# Patient Record
Sex: MALE | Race: OTHER | HISPANIC OR LATINO | URBAN - METROPOLITAN AREA
[De-identification: names, ages, dates, MRNs, and addresses within clinical notes are randomized per-mention and may not be internally consistent; named-entity substitution may affect disease eponyms.]

---

## 2019-12-04 ENCOUNTER — EMERGENCY (EMERGENCY)
Facility: HOSPITAL | Age: 29
LOS: 1 days | Discharge: HOME | End: 2019-12-04
Admitting: EMERGENCY MEDICINE
Payer: SELF-PAY

## 2019-12-04 VITALS
OXYGEN SATURATION: 99 % | RESPIRATION RATE: 18 BRPM | HEART RATE: 90 BPM | DIASTOLIC BLOOD PRESSURE: 95 MMHG | TEMPERATURE: 98 F | SYSTOLIC BLOOD PRESSURE: 142 MMHG

## 2019-12-04 PROCEDURE — 99283 EMERGENCY DEPT VISIT LOW MDM: CPT

## 2019-12-04 PROCEDURE — 99053 MED SERV 10PM-8AM 24 HR FAC: CPT

## 2019-12-04 RX ORDER — NEOMYCIN/POLYMYXIN B/HYDROCORT
4 SUSPENSION, DROPS(FINAL DOSAGE FORM)(ML) OTIC (EAR) ONCE
Refills: 0 | Status: COMPLETED | OUTPATIENT
Start: 2019-12-04 | End: 2019-12-04

## 2019-12-04 RX ORDER — NEOMYCIN/POLYMYXIN B/HYDROCORT
4 SUSPENSION, DROPS(FINAL DOSAGE FORM)(ML) OTIC (EAR)
Qty: 1 | Refills: 0
Start: 2019-12-04 | End: 2019-12-10

## 2019-12-04 RX ORDER — IBUPROFEN 200 MG
600 TABLET ORAL ONCE
Refills: 0 | Status: COMPLETED | OUTPATIENT
Start: 2019-12-04 | End: 2019-12-04

## 2019-12-04 RX ADMIN — Medication 600 MILLIGRAM(S): at 06:35

## 2019-12-04 RX ADMIN — Medication 4 DROP(S): at 06:36

## 2019-12-04 NOTE — ED PROVIDER NOTE - PATIENT PORTAL LINK FT
You can access the FollowMyHealth Patient Portal offered by [facility] by registering at the following website: [email or web address]. By joining [employer]’s FollowMyHealth portal, you will also be able to view your health information using other applications (apps) compatible with our system.

## 2019-12-04 NOTE — ED PROVIDER NOTE - CLINICAL SUMMARY MEDICAL DECISION MAKING FREE TEXT BOX
Left ear pain and hearing loss: clinical exam consistent with acute otitis externa.  No evidence of mastoiditis.  Will tx with otic drops.  Follow up with PCP/ENT.

## 2019-12-04 NOTE — ED PROVIDER NOTE - PHYSICAL EXAMINATION
CONSTITUTIONAL: WD,WN. NAD.    SKIN: Normal color and turgor. No rash.    HEAD: NC/AT.  EYES: Conjunctiva clear. EOMI. PERRL.    ENT: Airway patent, OP without erythema, tonsillar swelling or exudate; uvula midline without swelling. Nasal mucosa clear, no rhinorrhea.  Right external ear, EAC, and TM normal.  Left ear: + pain with manipulation of tragus and pinna.  EAC moderately swollen with trace debris, not completely closed.  Unable to visualize TM.  No mastoid tenderness or swelling, no proptosis of ear.   RESPIRATORY:  Breathing non-labored. No retractions or accessory muscle use.  Lungs CTA bilat.  CARDIOVASCULAR:  RRR, S1S2. No M/R/G.      GI:  Abdomen soft, nontender.    MSK: Neck supple with painless ROM.  No lower extremity edema or calf tenderness.  No joint swelling or ROM limitation.  NEURO: Alert and oriented; CN II-XII grossly intact. Speech clear. 5/5 strength in all extremities.  Normal balance and gait.

## 2019-12-04 NOTE — ED PROVIDER NOTE - OBJECTIVE STATEMENT
30 yo m without significant PMHx c/o left ear pain.    Duration apx 2 days; began during flight home from [facility].  Associated decreased ability to hear; no dizziness.  No URI symptoms or sore throat.  No recent swimming/scuba, or significant water exposure, but admits to using Q-tips after showering.  No recollection of trauma to ear.  Took ibuprofen 600 mg yesterday afternoon with good pain relief. Also using OTC homeopathic ear pain drops.      PMHx none   PShx none  Meds ibuprofen PRN  NKA

## 2019-12-04 NOTE — ED PROVIDER NOTE - NSFOLLOWUPINSTRUCTIONS_ED_ALL_ED_FT
Use the prescribed ear drops (4 drops in the left ear canal, 4 times daily) for 1 week.   Follow up with your  Primary Care Provider or ENT within 1 week.   Return to the Emergency Department if you have any new or worsening symptoms, or if you have any concerns.  ___________________________________________________________    OTITIS EXTERNA - AfterCare(R) Instructions(ER/ED)     Otitis Externa    WHAT YOU NEED TO KNOW:    Otitis externa, or swimmer's ear, is an infection in the outer ear canal. This canal goes from the outside of the ear to the eardrum. Ear Anatomy         DISCHARGE INSTRUCTIONS:    Return to the emergency department if:     You have severe ear pain.      You are suddenly unable to hear at all.      You have new swelling in your face, behind your ears, or in your neck.      You suddenly cannot move part of your face.      Your face suddenly feels numb.    Contact your healthcare provider if:     You have a fever.       Your signs and symptoms do not get better after 2 days of treatment.       Your signs and symptoms go away for a time, but then come back.       You have questions or concerns about your condition or care.     Medicines:     NSAIDs, such as ibuprofen, help decrease swelling, pain, and fever. This medicine is available with or without a doctor's order. NSAIDs can cause stomach bleeding or kidney problems in certain people. If you take blood thinner medicine, always ask if NSAIDs are safe for you. Always read the medicine label and follow directions. Do not give these medicines to children under 6 months of age without direction from your child's healthcare provider.      Acetaminophen decreases pain and fever. It is available without a doctor's order. Ask how much to take and how often to take it. Follow directions. Acetaminophen can cause liver damage if not taken correctly.      Ear drops that contain an antibiotic may be given. The antibiotic helps treat a bacterial infection. You may also be given steroid medicine. The steroid helps decrease redness, swelling, and pain.       Take your medicine as directed. Contact your healthcare provider if you think your medicine is not helping or if you have side effects. Tell him or her if you are allergic to any medicine. Keep a list of the medicines, vitamins, and herbs you take. Include the amounts, and when and why you take them. Bring the list or the pill bottles to follow-up visits. Carry your medicine list with you in case of an emergency.    Follow up with your healthcare provider as directed: Write down your questions so you remember to ask them during your visits.    How to use eardrops:     Lie down on your side with your infected ear facing up.      Carefully drip the correct number of eardrops into your ear. Have another person help you if possible.      Gently move the outside part of your ear back and forth to help the medicine reach your ear canal.       Stay lying down in the same position (with your ear facing up) for 3 to 5 minutes.     Prevent otitis externa:     Do not put cotton swabs or foreign objects in your ears.      Wrap a clean moist washcloth around your finger, and use it to clean your outer ear and remove extra ear wax.       Use ear plugs when you swim. Dry your outer ears completely after you swim or bathe.

## 2019-12-04 NOTE — ED ADULT NURSE NOTE - CHPI ED NUR SYMPTOMS NEG
no vomiting/no chills/no syncope/no loss of consciousness/no weakness/no fever/no nausea/no bleeding gums/no numbness

## 2019-12-04 NOTE — ED PROVIDER NOTE - NS ED ROS FT
CONSTITUTIONAL: No fever, chills, or weakness  NEURO: No headache, no dizziness, no syncope; No focal weakness/tingling/numbness  EYES: No visual changes  ENT: HPI  PULM: No cough or dyspnea  CV: No chest pain or palpitations  GI: No abdominal pain, vomiting, or diarrhea  : No dysuria, hematuria, frequency  MSK: No neck pain or back pain, no joint pain  SKIN: no rash or unusual bruising

## 2019-12-04 NOTE — ED PROVIDER NOTE - CARE PROVIDER_API CALL
Cortes Barth)  Otolaryngology  [address], [facility] [address]  [place], [place] [address]  Phone: [phone]  Fax: [phone]  Follow Up Time:

## 2019-12-11 DIAGNOSIS — H92.02 OTALGIA, LEFT EAR: ICD-10-CM

## 2019-12-11 DIAGNOSIS — H60.332 SWIMMER'S EAR, LEFT EAR: ICD-10-CM

## 2021-04-15 NOTE — ED ADULT NURSE NOTE - DISCHARGE DATE/TIME
VESTIBULAR EVALUATION      Diagnosis:  386.11 (ICD-9-CM) - H81.10 (ICD-10-CM) - Benign paroxysmal positional vertigo, unspecified laterality  Date of onset:  4/5/21      Pertinent history:       Medical history: See medical history in chart review       Co-morbidities significant to therapy include vertigo   ·      Social History: Patient lives alone  · Prior Hospitalization (past 30 days) no    · Previous treatment for same condition: yes  · Diagnostics none        INSURANCE BENEFITS:   Physician specifications: Eval and Treat     Attendance: Patient has been seen for 1 visits between 4/20/21 and  4/20/2021.    SUBJECTIVE: when getting up in the middle of the night she feels like she is going to get dizzy but she doesn't. She is still getting the ringing in the ear.      Patient's stated goals for therapy: to get it checked out        OBJECTIVE PROGRESS TOWARDS GOALS:        Oculomotor Exam:    Room light Response  4/20/21   Spontaneous Nystagmus Absent   Gaze holding Nystagmus Absent   Vergence  (>5cm abnormal) abnormal   Smooth Pursuit normal   Saccadic Eye Movement  Normal   Head Thrust Test Negative   Visual Acuity 1 Line difference         Positional Testing  Without Frenzel lenses Response  4/20/2021   Right Hallpike-Montegut test Nystagmus absent and Vertigo absent   Left Hallpike-Omid test Nystagmus absent and very slight dizziness   Right roll Test Nystagmus absent and Vertigo absent   Left roll Test Nystagmus absent and Vertigo absent     Special tests:    Dizziness Handicap Inventory 8      SKILLED SERVICES PROVIDED:        Physical Therapy Evaluation  [address]100 - PHYSICAL THERAPY EVALUATION MODERATE COMPLEXITY 30 MIN  Evaluation, education of findings, and plan of care.  The patient demonstrated an understanding.  See Assessment for findings.   Face to face evaluation time with the patient 25 .    Canalith Re-positioning maneuver to decrease vertigo symptoms : x 1 unit -8 minutes   Included patient education of  treatment, expectations, and movement restrictions following procedure.   Left modified Epley        Home exercise program (last updated 4/20/2021):  Patient issued home exercise program.  Patient demonstrated exercises correctly and was instructed to call with questions.   Post maneuver instruction      ASSESSMENT:  Patient is seeking physical therapy due to dizziness. Patient's continued symptoms are ringing in the ear which limit his/her ability to get up from bed quickly. Patient presents with signs and symptoms consistent with left benign paroxysmal positional vertigo. The impairments mentioned can be addressed with a vestibular rehabilitation program in order to maximize return of prior level of function.     See co-morbidities above that may impact therapy.  See subjective for patient’s self reporting limitations with functional activity.        A clinical presentation with:changing charactaristics    Patient's response to treatment: Better understanding of disease/injury    Functional improvement noted: Patient demonstrated understanding of home exercise program.    Precautions:none      TREATMENT PLAN AND GOALS:      Prognosis for meeting goals: excellent.   Treatment will consist of PT POC: home program, neuromuscular re-education, Physical therapy evaluation, therapeutic activities and therapeutic exercise.  Treatment plan was discussed with the patient and verbal consent was obtained.    Remaining Impairment Requiring Continued Treatment: decreased balance and impairment of functional performance      Long Term Goals (to be achieved in 1 weeks)  1. Patient will present with negative Hallpike test.-met    PLAN: Vestibular rehabilitation with progression to home program as tolerated.Discharge from physical therapy to continue with home exercise program.  Patient will be seen 1 times per week for 1 weeks.     Therapist Signature: Electronically signed by : Opal Galvin P.T. 4/20/2021           04-Dec-2019 06:52

## 2022-02-07 ENCOUNTER — EMERGENCY (EMERGENCY)
Facility: HOSPITAL | Age: 32
LOS: 1 days | Discharge: HOME | End: 2022-02-07
Admitting: EMERGENCY MEDICINE
Payer: COMMERCIAL

## 2022-02-07 PROCEDURE — 36415 COLL VENOUS BLD VENIPUNCTURE: CPT

## 2022-02-07 PROCEDURE — 85025 COMPLETE CBC W/AUTO DIFF WBC: CPT

## 2022-02-07 PROCEDURE — 80053 COMPREHEN METABOLIC PANEL: CPT

## 2022-02-07 PROCEDURE — G1004: CPT

## 2022-02-07 PROCEDURE — 70450 CT HEAD/BRAIN W/O DYE: CPT | Mod: MG

## 2022-03-01 PROBLEM — Z78.9 OTHER SPECIFIED HEALTH STATUS: Chronic | Status: ACTIVE | Noted: 2022-02-06

## 2025-03-03 ENCOUNTER — OFFICE (OUTPATIENT)
Dept: URBAN - METROPOLITAN AREA CLINIC 27 | Facility: CLINIC | Age: 35
Setting detail: OPHTHALMOLOGY
End: 2025-03-03
Payer: COMMERCIAL

## 2025-03-03 DIAGNOSIS — H18.603: ICD-10-CM

## 2025-03-03 PROBLEM — H11.152 PINGUECULA; LEFT EYE: Status: ACTIVE | Noted: 2025-03-03

## 2025-03-03 PROCEDURE — 92025 CPTRIZED CORNEAL TOPOGRAPHY: CPT | Performed by: OPHTHALMOLOGY

## 2025-03-03 PROCEDURE — 92002 INTRM OPH EXAM NEW PATIENT: CPT | Performed by: OPHTHALMOLOGY

## 2025-03-03 PROCEDURE — 76514 ECHO EXAM OF EYE THICKNESS: CPT | Performed by: OPHTHALMOLOGY

## 2025-03-03 ASSESSMENT — VISUAL ACUITY
OD_BCVA: 20/40-1
OS_BCVA: 20/30

## 2025-03-03 ASSESSMENT — REFRACTION_AUTOREFRACTION
OD_AXIS: 114
OD_CYLINDER: +8.50
OS_SPHERE: -10.50
OS_AXIS: 69
OD_SPHERE: -10.50
OS_CYLINDER: +8.75

## 2025-03-03 ASSESSMENT — KERATOMETRY
METHOD_AUTO_MANUAL: AUTO
OS_K1POWER_DIOPTERS: 42.50
OS_K2POWER_DIOPTERS: 49.75
OS_AXISANGLE_DEGREES: 63
OD_K2POWER_DIOPTERS: 49.50
OD_K1POWER_DIOPTERS: 43.00
OD_AXISANGLE_DEGREES: 121

## 2025-03-03 ASSESSMENT — PACHYMETRY
OD_CT_CORRECTION: 6
OS_CT_UM: 470
OD_CT_UM: 467
OS_CT_CORRECTION: 5

## 2025-03-03 ASSESSMENT — TONOMETRY
OD_IOP_MMHG: 16
OS_IOP_MMHG: 11

## 2025-03-03 ASSESSMENT — CONFRONTATIONAL VISUAL FIELD TEST (CVF)
OS_FINDINGS: FULL
OD_FINDINGS: FULL

## 2025-06-03 ENCOUNTER — OFFICE (OUTPATIENT)
Dept: URBAN - METROPOLITAN AREA CLINIC 27 | Facility: CLINIC | Age: 35
Setting detail: OPHTHALMOLOGY
End: 2025-06-03
Payer: COMMERCIAL

## 2025-06-03 DIAGNOSIS — H11.152: ICD-10-CM

## 2025-06-03 DIAGNOSIS — H18.603: ICD-10-CM

## 2025-06-03 PROBLEM — H10.45 ALLERGIC CONJUNCTIVITIS: Status: ACTIVE | Noted: 2025-06-03

## 2025-06-03 PROCEDURE — 99213 OFFICE O/P EST LOW 20 MIN: CPT | Performed by: OPHTHALMOLOGY

## 2025-06-03 PROCEDURE — 76514 ECHO EXAM OF EYE THICKNESS: CPT | Performed by: OPHTHALMOLOGY

## 2025-06-03 PROCEDURE — 92025 CPTRIZED CORNEAL TOPOGRAPHY: CPT | Performed by: OPHTHALMOLOGY

## 2025-06-03 ASSESSMENT — REFRACTION_CURRENTRX
OD_AXIS: 120
OS_AXIS: 28
OS_SPHERE: -3.00
OS_CYLINDER: +3.00
OD_OVR_VA: 20/
OD_VPRISM_DIRECTION: SV
OS_OVR_VA: 20/
OD_CYLINDER: +1.75
OS_VPRISM_DIRECTION: SV
OD_SPHERE: -2.25

## 2025-06-03 ASSESSMENT — REFRACTION_AUTOREFRACTION
OS_SPHERE: -9.75
OD_CYLINDER: +7.75
OS_CYLINDER: +8.75
OD_AXIS: 114
OD_SPHERE: -10.00
OS_AXIS: 67

## 2025-06-03 ASSESSMENT — KERATOMETRY
METHOD_AUTO_MANUAL: AUTO
OD_AXISANGLE_DEGREES: 120
OS_K1POWER_DIOPTERS: 42.25
OS_K2POWER_DIOPTERS: 49.75
OD_K2POWER_DIOPTERS: 49.25
OS_AXISANGLE_DEGREES: 60
OD_K1POWER_DIOPTERS: 43.25

## 2025-06-03 ASSESSMENT — TONOMETRY
OS_IOP_MMHG: 11
OD_IOP_MMHG: 11

## 2025-06-03 ASSESSMENT — VISUAL ACUITY
OD_BCVA: 20/20
OS_BCVA: 20/20-1

## 2025-06-03 ASSESSMENT — PACHYMETRY
OS_CT_CORRECTION: 5
OS_CT_UM: 470
OD_CT_CORRECTION: 6
OD_CT_UM: 468